# Patient Record
Sex: FEMALE | Race: WHITE | NOT HISPANIC OR LATINO | RURAL
[De-identification: names, ages, dates, MRNs, and addresses within clinical notes are randomized per-mention and may not be internally consistent; named-entity substitution may affect disease eponyms.]

---

## 2023-01-03 ENCOUNTER — OFFICE VISIT (OUTPATIENT)
Dept: RURAL CLINIC 2 | Facility: CLINIC | Age: 68
End: 2023-01-03

## 2023-01-03 RX ORDER — DIPHENOXYLATE HYDROCHLORIDE AND ATROPINE SULFATE 2.5; .025 MG/1; MG/1
1 TABLET AS NEEDED TABLET ORAL
Status: ACTIVE | COMMUNITY

## 2023-01-03 RX ORDER — PHENOL 1.4 %
1 TABLET WITH FOOD AEROSOL, SPRAY (ML) MUCOUS MEMBRANE TWICE A DAY
Status: ACTIVE | COMMUNITY

## 2023-01-03 RX ORDER — SACCHAROMYCES BOULARDII 50 MG
AS DIRECTED CAPSULE ORAL
Status: ACTIVE | COMMUNITY

## 2023-01-03 RX ORDER — ALBUTEROL SULFATE 90 UG/1
1 PUFF AS NEEDED AEROSOL, METERED RESPIRATORY (INHALATION)
Status: ACTIVE | COMMUNITY

## 2023-01-17 ENCOUNTER — OFFICE VISIT (OUTPATIENT)
Dept: RURAL CLINIC 2 | Facility: CLINIC | Age: 68
End: 2023-01-17
Payer: MEDICARE

## 2023-01-17 ENCOUNTER — DASHBOARD ENCOUNTERS (OUTPATIENT)
Age: 68
End: 2023-01-17

## 2023-01-17 VITALS
BODY MASS INDEX: 19.19 KG/M2 | SYSTOLIC BLOOD PRESSURE: 113 MMHG | WEIGHT: 95.2 LBS | TEMPERATURE: 96.4 F | HEIGHT: 59 IN | HEART RATE: 71 BPM | DIASTOLIC BLOOD PRESSURE: 75 MMHG

## 2023-01-17 DIAGNOSIS — R19.7 DIARRHEA, UNSPECIFIED TYPE: ICD-10-CM

## 2023-01-17 PROBLEM — 62315008: Status: ACTIVE | Noted: 2023-01-17

## 2023-01-17 PROBLEM — 16093611000119107: Status: ACTIVE | Noted: 2023-01-17

## 2023-01-17 PROBLEM — 428283002: Status: ACTIVE | Noted: 2023-01-17

## 2023-01-17 PROCEDURE — 99203 OFFICE O/P NEW LOW 30 MIN: CPT | Performed by: NURSE PRACTITIONER

## 2023-01-17 RX ORDER — ALBUTEROL SULFATE 90 UG/1
1 PUFF AS NEEDED AEROSOL, METERED RESPIRATORY (INHALATION)
Status: DISCONTINUED | COMMUNITY

## 2023-01-17 RX ORDER — SACCHAROMYCES BOULARDII 50 MG
AS DIRECTED CAPSULE ORAL
Status: DISCONTINUED | COMMUNITY

## 2023-01-17 RX ORDER — DIPHENOXYLATE HYDROCHLORIDE AND ATROPINE SULFATE 2.5; .025 MG/1; MG/1
1 TABLET AS NEEDED TABLET ORAL
Status: DISCONTINUED | COMMUNITY

## 2023-01-17 RX ORDER — MULTIVITAMIN
1 TABLET TABLET ORAL ONCE A DAY
Status: ACTIVE | COMMUNITY

## 2023-01-17 RX ORDER — PHENOL 1.4 %
1 TABLET WITH FOOD AEROSOL, SPRAY (ML) MUCOUS MEMBRANE TWICE A DAY
Status: ACTIVE | COMMUNITY

## 2023-01-17 NOTE — HPI-TODAY'S VISIT:
The patient is a 67-year-old female who presents on referral from Lizet Brian nurse practitioner for the evaluation of chronic diarrhea.  A copy of this document to be sent to the referring provider.  The patient reports a 3-month history of diarrhea occurring 5-6 times per day.  She denies urgency, abdominal pain, melena or hematochezia.  She denies antibiotic use, change in medications, travel out of the country or sick contacts.  Stool testing completed to eval for ova and parasite, Salmonella, Shigella, Campylobacter, E. coli and C. difficile with negative results.  The patient was started on Benefiber daily as well as a probiotic approximately 6 weeks ago and she denies any further diarrhea for the past 3 weeks.  Her bowel pattern is occurring once daily and is formed.  The patient last underwent a colonoscopy in May 2022 completed by Dr. Hernandez and recalls polyp's were removed and was recommended to repeat a colonoscopy in 5 years.  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